# Patient Record
Sex: FEMALE | Race: BLACK OR AFRICAN AMERICAN | URBAN - METROPOLITAN AREA
[De-identification: names, ages, dates, MRNs, and addresses within clinical notes are randomized per-mention and may not be internally consistent; named-entity substitution may affect disease eponyms.]

---

## 2017-04-18 PROBLEM — Z82.0 FAMILY HISTORY OF MYASTHENIA GRAVIS: Status: ACTIVE | Noted: 2017-04-18

## 2017-04-18 PROCEDURE — 83519 RIA NONANTIBODY: CPT | Performed by: INTERNAL MEDICINE

## 2017-04-19 PROCEDURE — 87591 N.GONORRHOEAE DNA AMP PROB: CPT | Performed by: FAMILY MEDICINE

## 2017-04-19 PROCEDURE — 87389 HIV-1 AG W/HIV-1&-2 AB AG IA: CPT | Performed by: FAMILY MEDICINE

## 2017-04-19 PROCEDURE — 87491 CHLMYD TRACH DNA AMP PROBE: CPT | Performed by: FAMILY MEDICINE

## 2017-04-21 PROBLEM — I51.7 RIGHT ATRIAL ENLARGEMENT: Status: ACTIVE | Noted: 2017-04-21

## 2017-05-24 ENCOUNTER — OFFICE VISIT (OUTPATIENT)
Dept: SLEEP CENTER | Facility: HOSPITAL | Age: 27
End: 2017-05-24
Attending: INTERNAL MEDICINE
Payer: MEDICAID

## 2017-05-24 DIAGNOSIS — R06.83 SNORING: ICD-10-CM

## 2017-05-24 DIAGNOSIS — I51.7 RIGHT ATRIAL ENLARGEMENT: ICD-10-CM

## 2017-05-24 PROCEDURE — 95810 POLYSOM 6/> YRS 4/> PARAM: CPT

## 2017-06-06 NOTE — PROCEDURES
[address] [address],[facility] [address]       Accredited by the [facility] ([facility])    PATIENT'S NAME:        Maria Esther Griggs  ATTENDING PHYSICIAN:   Yoni Mcguire M.D. REFERRING PHYSICIAN:   Wanda Rosario M.D.   PATIENT A ACTIVITY:  Not observed. CARDIAC MONITORING:  Normal sinus rhythm throughout. EEG:  With the limited montage, no EEG abnormalities were observed. RECOMMENDATIONS:  Together with the clinical history, this demonstrates primary snoring.     There do

## (undated) NOTE — MR AVS SNAPSHOT
After Visit Summary   5/24/2017    Linda Saab    MRN: [id number]           Visit Information        Provider Department Dept Phone    5/24/2017  9:00 PM Bed1  [facility] [phone]      Allergies as of 5/24/2017  Reviewed on: 4/18/201 be used at a later time if you forget your password. Enter your e-mail address. You will receive e-mail notification when new information is available in [address] Click Sign In. You can now view your medical record.       Additional Information    If